# Patient Record
Sex: FEMALE | Race: WHITE | NOT HISPANIC OR LATINO | Employment: OTHER | URBAN - METROPOLITAN AREA
[De-identification: names, ages, dates, MRNs, and addresses within clinical notes are randomized per-mention and may not be internally consistent; named-entity substitution may affect disease eponyms.]

---

## 2019-12-18 PROCEDURE — 87086 URINE CULTURE/COLONY COUNT: CPT | Performed by: FAMILY MEDICINE

## 2021-03-01 PROBLEM — D05.10 DCIS (DUCTAL CARCINOMA IN SITU): Status: ACTIVE | Noted: 2019-12-27

## 2021-03-01 PROBLEM — E03.9 HYPOTHYROIDISM: Status: ACTIVE | Noted: 2021-03-01

## 2021-03-01 PROBLEM — R87.89 OTHER ABNORMAL FINDINGS IN SPECIMENS FROM FEMALE GENITAL ORGANS: Status: ACTIVE | Noted: 2017-09-26

## 2021-03-01 PROBLEM — E78.5 HYPERLIPIDEMIA: Status: ACTIVE | Noted: 2021-03-01

## 2021-03-01 PROBLEM — E78.00 HYPERCHOLESTEREMIA: Status: ACTIVE | Noted: 2021-03-01

## 2021-03-01 PROBLEM — N89.8 VAGINAL DISCHARGE: Status: ACTIVE | Noted: 2017-09-26

## 2021-03-08 ENCOUNTER — OFFICE VISIT (OUTPATIENT)
Dept: PODIATRY | Facility: CLINIC | Age: 81
End: 2021-03-08

## 2021-03-08 VITALS
HEIGHT: 64 IN | WEIGHT: 126 LBS | DIASTOLIC BLOOD PRESSURE: 79 MMHG | HEART RATE: 86 BPM | BODY MASS INDEX: 21.51 KG/M2 | SYSTOLIC BLOOD PRESSURE: 174 MMHG

## 2021-03-08 DIAGNOSIS — M25.372 ANKLE INSTABILITY, LEFT: ICD-10-CM

## 2021-03-08 DIAGNOSIS — M25.572 ACUTE LEFT ANKLE PAIN: Primary | ICD-10-CM

## 2021-03-08 PROCEDURE — 99203 OFFICE O/P NEW LOW 30 MIN: CPT | Performed by: PODIATRIST

## 2021-03-08 PROCEDURE — 97760 ORTHOTIC MGMT&TRAING 1ST ENC: CPT | Performed by: PODIATRIST

## 2021-03-09 NOTE — PATIENT INSTRUCTIONS
Chronic Ankle Instability  Chronic ankle instability is a condition that makes the ankle weak and more likely to give way. The condition is common among athletes, especially those with prior ankle ligament injury. Ligaments are strong tissues that connect bones to each other.  What are the causes?    This condition is caused by multiple ankle sprains that have not healed properly, leaving the ankle ligaments loose or damaged.  What increases the risk?  This condition is more likely to develop in people who participate in sports in which there is a risk of spraining an ankle. These sports include:  · Cross-country trail running.  · Basketball.  · Baseball.  · Tennis.  · Football.  · Soccer.  What are the signs or symptoms?  Symptoms of this condition include:  · Rolling your ankle repeatedly.  · Swelling.  · Pain.  · Bruising.  · Tenderness.  · Feeling wobbly or unsteady on your foot.  · Difficulty walking on uneven surfaces or in the dark.  How is this diagnosed?  This condition may be diagnosed based on:  · Your symptoms.  · Your medical history.  · A physical exam. Your health care provider will check your balance, strength, and range of motion. He or she will also check your injured ankle against your healthy ankle.  · Imaging tests, such as:  ? An X-ray.  ? A CT scan.  ? An MRI.  ? An ultrasound.  How is this treated?  Treatment for this condition may include:  · Wearing a removable boot, brace, or splint.  · Wearing supportive shoes or shoe inserts.  · Applying ice to the ankle to reduce swelling.  · Taking anti-inflammatory pain medicine.  · Doing exercises (physical therapy).  · Not putting any body weight, or putting only limited body weight, on your ankle for several days.  · Gradually returning to full activity.  · Surgery to repair damaged ligaments.  Usually, surgery is needed only if the condition is severe or if other treatments do not work.  Follow these instructions at home:  If you have a boot,  brace, or splint:  · Wear it as told by your health care provider. Remove it only as told by your health care provider.  · Loosen it if your toes tingle, become numb, or turn cold and blue.  · Keep it clean.  · If it is not waterproof:  ? Do not let it get wet.  ? Cover it with a watertight covering when you take a bath or a shower.  · Ask your health care provider when it is safe to drive with a boot, brace, or splint on your foot.  Managing pain, stiffness, and swelling    · If directed, put ice on the injured area.  ? If you have a removable boot, brace, or splint, remove it as told by your health care provider.  ? Put ice in a plastic bag.  ? Place a towel between your skin and the bag.  ? Leave the ice on for 20 minutes, 2-3 times a day.  · Move your toes, foot, and ankle often to reduce stiffness and swelling.  · Raise (elevate) the injured area above the level of your heart while you are sitting or lying down.  Activity  · Return to your normal activities as told by your health care provider. Ask your health care provider what activities are safe for you.  · Do not put your full body weight on your ankle until your health care provider says that you can.  · Do not do any activities that make pain or swelling worse.  · Do exercises as told by your health care provider.  General instructions  · Take over-the-counter and prescription medicines only as told by your health care provider.  · Wear supportive shoes or inserts as told by your health care provider.  · Keep all follow-up visits as told by your health care provider. This is important.  How is this prevented?  · Wear supportive footwear that is appropriate for your athletic activity.  · Avoid athletic activities that cause pain or swelling in your ankle.  · See your health care provider if you have an ankle sprain that causes pain and swelling for more than 2-4 weeks.  · Do ankle range-of-motion and strengthening exercises as told by your health care  provider before beginning any athletic activity.  · If you start a new athletic activity, start gradually to build up your strength and flexibility.  Contact a health care provider if:  · Your condition is not getting better after 2-4 weeks of treatment.  · You cannot put body weight on your ankle without feeling more pain.  Summary  · Chronic ankle instability is a condition that makes the ankle weak and more likely to give way.  · This condition is caused by multiple ankle sprains that have not healed properly, leaving the ankle ligaments loose or damaged.  · Treatment includes wearing a boot, brace, or splint, taking medicines for pain and inflammation, and using ice on the affected area.  · Follow your health care provider's instructions for caring for your ankle during recovery.  · Contact your health care provider if your ankle does not get better in 2-4 weeks, or if you cannot put weight on your ankle without feeling more pain.  This information is not intended to replace advice given to you by your health care provider. Make sure you discuss any questions you have with your health care provider.  Document Revised: 10/01/2019 Document Reviewed: 10/01/2019  Elsevier Patient Education © 2020 [employer]

## 2021-03-09 NOTE — PROGRESS NOTES
03/08/2021  Foot and Ankle Surgery - New Patient   Provider: Dr. Tamir Melton DPM  Location: [facility]    Subjective:  Karla Pollard is a 81 y.o. female.     Chief Complaint   Patient presents with   • Left Ankle - Pain       HPI: Patient is an 81-year-old female that presents with intermittent discomfort and feelings that the left ankle will give out on her.  She states that she had an inversion injury approximately 4 months ago.  She did notice some significant swelling, bruising, and discomfort after the injury.  She did notice gradual improvement.  She recently went to the urgent care center because of continued discomfort and limitation.  She rates the pain a 6 out of 10 at times.  She is concerned that she is unable to do her normal activities because of the limitation.  She states that she is very active and likes to walk for exercise.  She also notices symptoms with uneven terrain.  She has not had previous issues with the left ankle.  She denies any other complaints today    Allergies   Allergen Reactions   • Bisacodyl Rash   • Sulfa Antibiotics Hives   • Docusate Calcium Rash   • Sulfamethoxazole-Trimethoprim Rash       Past Medical History:   Diagnosis Date   • Disease of thyroid gland    • Hyperlipidemia        Past Surgical History:   Procedure Laterality Date   • BREAST SURGERY     • HYSTERECTOMY     • TONSILLECTOMY     • VASCULAR SURGERY         Family History   Family history unknown: Yes       Social History     Socioeconomic History   • Marital status:      Spouse name: Not on file   • Number of children: Not on file   • Years of education: Not on file   • Highest education level: Not on file   Tobacco Use   • Smoking status: Never Smoker   • Smokeless tobacco: Never Used   Vaping Use   • Vaping Use: Never used   Substance and Sexual Activity   • Alcohol use: Never   • Drug use: Never   • Sexual activity: Defer        Current Outpatient Medications on File Prior to Visit  "  Medication Sig Dispense Refill   • Cholecalciferol (VITAMIN D3) 50 MCG (2000 UT) capsule TAKE ONE CAPSULE BY MOUTH DAILY     • levothyroxine (SYNTHROID, LEVOTHROID) 75 MCG tablet      • simvastatin (ZOCOR) 40 MG tablet SIMVASTATIN 40 MG TABS       No current facility-administered medications on file prior to visit.       Review of Systems:  General: Denies fever, chills, fatigue, and weakness.  Eyes: Denies vision loss, blurry vision, and excessive redness.  ENT: Denies hearing issues and difficulty swallowing.  Cardiovascular: Denies palpitations, chest pain, or syncopal episodes.  Respiratory: Denies shortness of breath, wheezing, and coughing.  GI: Denies abdominal pain, nausea, and vomiting.   : Denies frequency, hematuria, and urgency.  Musculoskeletal: + Left ankle pain  Derm: Denies rash, open wounds, or suspicious lesions.  Neuro: Denies headaches, numbness, loss of coordination, and tremors.  Psych: Denies anxiety and depression.  Endocrine: Denies temperature intolerance and changes in appetite.  Heme: Denies bleeding disorders or abnormal bruising.     Objective   /79   Pulse 86   Ht 161.3 cm (63.5\")   Wt 57.2 kg (126 lb)   BMI 21.97 kg/m²     Foot/Ankle Exam:       General:   Appearance: appears stated age and healthy    Orientation: AAOx3    Affect: appropriate    Gait: antalgic      VASCULAR      Left Foot Vascularity   Normal vascular exam    Dorsalis pedis:  2+  Posterior tibial:  2+  Skin Temperature: warm    Edema Grading:  None  CFT:  < 3 seconds  Pedal Hair Growth:  Present  Varicosities: none        NEUROLOGIC     Left Foot Neurologic   Light touch sensation:  Normal  Hot/cold sensation: normal    Achilles reflex:  2+     MUSCULOSKELETAL      Left Foot Musculoskeletal   Ecchymosis:  None  Tenderness: lateral malleolus and anterior tibiotalar joint line    Arch:  Normal     MUSCLE STRENGTH     Left Foot Muscle Strength   Normal strength    Foot dorsiflexion:  5  Foot plantar " flexion:  5  Foot inversion:  5  Foot eversion:  5     RANGE OF MOTION      Left Foot Range of Motion   Foot and ankle ROM within normal limits       DERMATOLOGIC     Left Foot Dermatologic   Skin: skin intact       TESTS     Left Foot Tests   Anterior drawer: positive    Varus tilt: negative        Left Foot Additional Comments: Mild instability with anterior drawer testing but no significant laxity as compared to the contralateral extremity.  Mild discomfort with palpation to the anterior lateral aspect of the ankle.  No gross deformity.  No proximal fibular pain.      Assessment/Plan   Diagnoses and all orders for this visit:    1. Acute left ankle pain (Primary)  -     XR Ankle 3+ View Left    2. Ankle instability, left      Patient presents with issues involving the left ankle.  Imaging was reviewed showing no obvious fracture dislocation or dipika degenerative changes.  On exam, she does have discomfort involving the anterior lateral aspect of the ankle and complains of weakness.  She does have mild instability as compared to the right lower extremity.  I explained that symptoms are consistent with mild instability.  I have recommended that we proceed with a lace up ankle brace.  We did dispense the brace and greater than 15 minutes was spent reviewing the proper use and effects.  I have also asked that she start stretching, manual therapy, and strengthening exercises which were reviewed in office.  We did review appropriate shoes and to avoid uneven terrain and high impact activities.  I do feel that symptoms will gradually improve.  She is to call with any additional issues or concerns.  I would like to see her in 4 weeks for reevaluation    Orders Placed This Encounter   Procedures   • XR Ankle 3+ View Left     Order Specific Question:   Reason for Exam:     Answer:   Left ankle pain for about 4 months just has got worse RM 9 WB     Order Specific Question:   Does this patient have a diabetic  monitoring/medication delivering device on?     Answer:   No        Note is dictated utilizing voice recognition software. Unfortunately this leads to occasional typographical errors. I apologize in advance if the situation occurs. If questions occur please do not hesitate to call our office.

## 2021-04-05 ENCOUNTER — OFFICE VISIT (OUTPATIENT)
Dept: PODIATRY | Facility: CLINIC | Age: 81
End: 2021-04-05

## 2021-04-05 VITALS
DIASTOLIC BLOOD PRESSURE: 77 MMHG | HEART RATE: 96 BPM | HEIGHT: 64 IN | WEIGHT: 126 LBS | BODY MASS INDEX: 21.51 KG/M2 | SYSTOLIC BLOOD PRESSURE: 198 MMHG

## 2021-04-05 DIAGNOSIS — M72.2 PLANTAR FASCIITIS OF LEFT FOOT: ICD-10-CM

## 2021-04-05 DIAGNOSIS — M25.372 ANKLE INSTABILITY, LEFT: Primary | ICD-10-CM

## 2021-04-05 PROCEDURE — 99213 OFFICE O/P EST LOW 20 MIN: CPT | Performed by: PODIATRIST

## 2021-04-05 NOTE — PROGRESS NOTES
"04/05/2021  Foot and Ankle Surgery - Established Patient/Follow-up  Provider: Dr. Tamir Melton DPM  Location: [facility]    Subjective:  Karla Pollard is a 81 y.o. female.     Chief Complaint   Patient presents with   • Left Ankle - Follow-up       HPI: Patient returns for follow-up on left foot and ankle pain.  She states that the ankle does feel better with the brace.  She continues to have discomfort involving the lateral column of the foot.  Symptoms are worse with increased activity improved with rest.  She continues to walk approximately 1 mile with friends 3 days a week.  She has been wearing the brace on a daily basis.    Allergies   Allergen Reactions   • Bisacodyl Rash   • Sulfa Antibiotics Hives   • Docusate Calcium Rash   • Sulfamethoxazole-Trimethoprim Rash       Current Outpatient Medications on File Prior to Visit   Medication Sig Dispense Refill   • Cholecalciferol (VITAMIN D3) 50 MCG (2000 UT) capsule TAKE ONE CAPSULE BY MOUTH DAILY     • levothyroxine (SYNTHROID, LEVOTHROID) 75 MCG tablet      • simvastatin (ZOCOR) 40 MG tablet SIMVASTATIN 40 MG TABS       No current facility-administered medications on file prior to visit.       Objective   BP (!) 198/77   Pulse 96   Ht 161.3 cm (63.5\")   Wt 57.2 kg (126 lb)   BMI 21.97 kg/m²      General:   Appearance: appears stated age and healthy    Orientation: AAOx3    Affect: appropriate    Gait: antalgic       VASCULAR       Left Foot Vascularity   Normal vascular exam    Dorsalis pedis:  2+  Posterior tibial:  2+  Skin Temperature: warm    Edema Grading:  None  CFT:  < 3 seconds  Pedal Hair Growth:  Present  Varicosities: none        NEUROLOGIC      Left Foot Neurologic   Light touch sensation:  Normal  Hot/cold sensation: normal    Achilles reflex:  2+      MUSCULOSKELETAL       Left Foot Musculoskeletal   Ecchymosis:  None  Tenderness: lateral malleolus and anterior tibiotalar joint line    Arch:  Normal      MUSCLE STRENGTH      Left " Foot Muscle Strength   Normal strength    Foot dorsiflexion:  5  Foot plantar flexion:  5  Foot inversion:  5  Foot eversion:  5      RANGE OF MOTION       Left Foot Range of Motion   Foot and ankle ROM within normal limits        DERMATOLOGIC      Left Foot Dermatologic   Skin: skin intact        TESTS      Left Foot Tests   Anterior drawer: positive    Varus tilt: negative        Left Foot Additional Comments: Instability is relatively unchanged.  She does have mild discomfort involving the lateral column of the foot    Assessment/Plan   Diagnoses and all orders for this visit:    1. Ankle instability, left (Primary)    2. Plantar fasciitis of left foot      Patient returns with some improvement as compared to last exam.  She feels approximately 20% better with the brace.  At this time, she does complain of pain involving the lateral column of the foot.  She states that symptoms are better with activity and typically noticed after periods of rest.  I do feel that she is having issues consistent with plantar fasciitis.  I have recommended that she acquire a pair of over-the-counter arch supports.  We did discuss proper use and effects.  I would like her to wean herself away from the lace up brace.  I have suggested that she perform low impact exercises instead of walking.  We also discussed the possibility of formal physical therapy in which she would like to hold off on.  I will see her in 4 weeks for reevaluation.    No orders of the defined types were placed in this encounter.         Note is dictated utilizing voice recognition software. Unfortunately this leads to occasional typographical errors. I apologize in advance if the situation occurs. If questions occur please do not hesitate to call our office.

## 2021-05-01 PROCEDURE — 87086 URINE CULTURE/COLONY COUNT: CPT | Performed by: NURSE PRACTITIONER

## 2021-05-01 PROCEDURE — 87077 CULTURE AEROBIC IDENTIFY: CPT | Performed by: NURSE PRACTITIONER

## 2021-05-01 PROCEDURE — 87186 SC STD MICRODIL/AGAR DIL: CPT | Performed by: NURSE PRACTITIONER

## 2021-05-03 ENCOUNTER — OFFICE VISIT (OUTPATIENT)
Dept: PODIATRY | Facility: CLINIC | Age: 81
End: 2021-05-03

## 2021-05-03 VITALS
DIASTOLIC BLOOD PRESSURE: 73 MMHG | SYSTOLIC BLOOD PRESSURE: 168 MMHG | HEIGHT: 63 IN | HEART RATE: 88 BPM | WEIGHT: 125 LBS | BODY MASS INDEX: 22.15 KG/M2

## 2021-05-03 DIAGNOSIS — M25.372 ANKLE INSTABILITY, LEFT: ICD-10-CM

## 2021-05-03 DIAGNOSIS — M76.72 PERONEAL TENDINITIS OF LEFT LOWER EXTREMITY: Primary | ICD-10-CM

## 2021-05-03 PROCEDURE — 99213 OFFICE O/P EST LOW 20 MIN: CPT | Performed by: PODIATRIST

## 2021-05-04 NOTE — PROGRESS NOTES
"05/03/2021  Foot and Ankle Surgery - Established Patient/Follow-up  Provider: Dr. Tamir Melton DPM  Location: [facility]    Subjective:  Karla Pollard is a 81 y.o. female.     Chief Complaint   Patient presents with   • Left Foot - Follow-up   • Left Ankle - Follow-up       HPI: Patient returns for follow-up regarding her left lower extremity.  She states that she has noticed continued improvement with the over-the-counter arch supports.  She has remained quite active but continues to deal with discomfort after periods of inactivity.  She complains of most of the pain involving the lateral aspect of the foot and ankle.  She has not had any injury or other complaints.    Allergies   Allergen Reactions   • Bisacodyl Rash   • Sulfa Antibiotics Hives   • Docusate Calcium Rash   • Sulfamethoxazole-Trimethoprim Rash       Current Outpatient Medications on File Prior to Visit   Medication Sig Dispense Refill   • cefdinir (OMNICEF) 300 MG capsule Take 1 capsule by mouth 2 (Two) Times a Day for 10 days. 20 capsule 0   • Cholecalciferol (VITAMIN D3) 50 MCG (2000 UT) capsule TAKE ONE CAPSULE BY MOUTH DAILY     • levothyroxine (SYNTHROID, LEVOTHROID) 75 MCG tablet      • simvastatin (ZOCOR) 40 MG tablet SIMVASTATIN 40 MG TABS       No current facility-administered medications on file prior to visit.       Objective   /73   Pulse 88   Ht 160 cm (63\")   Wt 56.7 kg (125 lb)   BMI 22.14 kg/m²     General:   Appearance: appears stated age and healthy    Orientation: AAOx3    Affect: appropriate    Gait: antalgic       VASCULAR       Left Foot Vascularity   Normal vascular exam    Dorsalis pedis:  2+  Posterior tibial:  2+  Skin Temperature: warm    Edema Grading:  None  CFT:  < 3 seconds  Pedal Hair Growth:  Present  Varicosities: none        NEUROLOGIC      Left Foot Neurologic   Light touch sensation:  Normal  Hot/cold sensation: normal    Achilles reflex:  2+      MUSCULOSKELETAL       Left Foot " Musculoskeletal   Ecchymosis:  None  Tenderness: lateral malleolus and anterior tibiotalar joint line    Arch:  Normal      MUSCLE STRENGTH      Left Foot Muscle Strength   Normal strength    Foot dorsiflexion:  5  Foot plantar flexion:  5  Foot inversion:  5  Foot eversion:  5      RANGE OF MOTION       Left Foot Range of Motion   Foot and ankle ROM within normal limits        DERMATOLOGIC      Left Foot Dermatologic   Skin: skin intact        TESTS      Left Foot Tests   Anterior drawer: positive    Varus tilt: negative        Left Foot Additional Comments: Continued discomfort involving the lateral aspect of the ankle and along the peroneal tendon course.  Discomfort is mild.  No swelling or signs of inflammation.  Range of motion is appropriate.  Mild instability as compared to the contralateral extremity    Assessment/Plan   Diagnoses and all orders for this visit:    1. Peroneal tendinitis of left lower extremity (Primary)  -     Ambulatory Referral to Physical Therapy Evaluate and treat    2. Ankle instability, left      Patient is doing relatively well and has noticed improvement with the inserts but continues to remain symptomatic at the level of the peroneal tendons.  Given that she continues to have issues, I have recommended that we proceed with formal physical therapy.  Referral has been placed for treatment.  I have asked that she continue at home exercises and wearing the over-the-counter inserts.  We did discuss proper activity level and use of OTC anti-inflammatories as needed.  I would like her to monitor and return in 6 to 8 weeks for reevaluation.  If she continues to have limitation at that time, may need to consider further evaluation with an MRI.    Orders Placed This Encounter   Procedures   • Ambulatory Referral to Physical Therapy Evaluate and treat     Referral Priority:   Routine     Referral Type:   Physical Therapy     Referral Reason:   Specialty Services Required     Requested  Specialty:   Physical Therapy     Number of Visits Requested:   1          Note is dictated utilizing voice recognition software. Unfortunately this leads to occasional typographical errors. I apologize in advance if the situation occurs. If questions occur please do not hesitate to call our office.

## 2021-05-12 ENCOUNTER — TREATMENT (OUTPATIENT)
Dept: PHYSICAL THERAPY | Facility: CLINIC | Age: 81
End: 2021-05-12

## 2021-05-12 DIAGNOSIS — M76.72 PERONEAL TENDONITIS OF LEFT LOWER LEG: Primary | ICD-10-CM

## 2021-05-12 DIAGNOSIS — M79.672 LEFT FOOT PAIN: ICD-10-CM

## 2021-05-12 PROCEDURE — 97162 PT EVAL MOD COMPLEX 30 MIN: CPT | Performed by: PHYSICAL THERAPIST

## 2021-05-12 PROCEDURE — 97110 THERAPEUTIC EXERCISES: CPT | Performed by: PHYSICAL THERAPIST

## 2021-05-12 NOTE — PROGRESS NOTES
Physical Therapy Initial Evaluation and Plan of Care    Patient: Karla Pollard   : 1940  Diagnosis/ICD-10 Code:  Peroneal tendonitis of left lower leg [M76.72]  Referring practitioner: LORRI Melton DPM    Subjective Evaluation    History of Present Illness  Mechanism of injury: No specific mechanism - does not remember any incident but reports could have stepped on acorn in working in her yard     Subjective comment: Patient is 81 year old female who presetns with a diagnosis of left peroneal tendonitis.  She reports 4-6 month history of pain and went to MD after symptoms did not improved over several months.  She reports has gotten new shoes per DPM instructions and noticed some improvement with this.  No prior injury or pain in her foot.   Patient Occupation: Retired.  Quality of life: good    Pain  Current pain ratin  At best pain ratin  At worst pain ratin  Pain location: Left ankle   Quality: dull ache  Relieving factors: rest (New shoes )  Aggravating factors: ambulation and prolonged positioning  Progression: improved    Social Support  Lives in: multiple-level home  Lives with: spouse             Objective          Static Posture     Comments  Flexible pes planus, R hip ER at rest     Active Range of Motion   Left Ankle/Foot   Dorsiflexion (kf): 2 degrees   Plantar flexion: 48 degrees   Inversion: 38 degrees   Eversion: 15 degrees     Right Ankle/Foot   Dorsiflexion (kf): 5 degrees   Plantar flexion: 41 degrees   Inversion: 40 degrees   Eversion: 18 degrees     Joint Play   Left Ankle/Foot  Joints within functional limits are the midfoot. Hypomobile in the talocrural joint and forefoot.     Right Ankle/Foot  Joints within functional limits are the forefoot. Hypomobile in the talocrural joint and midfoot.     Strength/Myotome Testing     Left Ankle/Foot   Dorsiflexion: 5  Inversion: 5  Eversion: 4+ (Pain )    Right Ankle/Foot   Dorsiflexion: 5  Inversion: 5  Eversion: 5    Functional  Assessment     Comments  Single leg balance - R 4 seconds  L 2 seconds    Five Time Sit to Stand 16.56 second          Assessment & Plan     Assessment  Impairments: abnormal gait, abnormal or restricted ROM, activity intolerance, impaired balance, impaired physical strength, lacks appropriate home exercise program, pain with function and weight-bearing intolerance  Assessment details: Presents with limit in L ankle/foot ROM, strength, pain to palpation at insertion left peroneals, altered gait, slight decrease in functional LE strength per FTSST.  She would benefit from skilled therapy to address the above and restore to highest level of prior function.   Prognosis: good  Functional Limitations: lifting  Goals  Plan Goals: ST. Independent and compliant with HEP over 2 weeks.   2. To tolerate progression of balance and foot/ankle strength progression w/o increased pain over 2 weeks.   3. B DF AROM improved 3-5 degrees or greater over 2 weeks.     LT. LEFS score = 90% or greater within 6 weeks.   2. Left ankle/foot strength = R within 6 weeks.   3. Gait mechanics normal over 6 weeks with ability to resume prior walking program w/o pain.   4. FTSST time decreased to 13 seconds or less by discharge.       Plan  Therapy options: will be seen for skilled physical therapy services  Planned modality interventions: cryotherapy, electrical stimulation/Russian stimulation, TENS and thermotherapy (hydrocollator packs)  Planned therapy interventions: manual therapy, neuromuscular re-education, balance/weight-bearing training, soft tissue mobilization, flexibility, functional ROM exercises, strengthening, stretching, gait training, home exercise program, therapeutic activities and joint mobilization  Frequency: 1x week  Duration in visits: 6  Treatment plan discussed with: patient          Timed:           Therapeutic Exercise:    12     mins  [id number];       Un-Timed:  Mod Eval     30     Mins  [id number]      Timed  Treatment:   42   mins   Total Treatment:     42   mins    PT SIGNATURE: Nando Loredo PT, DPT       DATE TREATMENT INITIATED: 5/12/2021    Medicare Initial Certification  Certification Period: 8/10/2021  I certify that the therapy services are furnished while this patient is under my care.  The services outlined above are required by this patient, and will be reviewed every 90 days.     PHYSICIAN: LORRI Melton, MUSHTAQ      DATE:     Please sign and return via fax to [phone].. Thank you, [facility].

## 2021-05-19 ENCOUNTER — TREATMENT (OUTPATIENT)
Dept: PHYSICAL THERAPY | Facility: CLINIC | Age: 81
End: 2021-05-19

## 2021-05-19 DIAGNOSIS — M79.672 LEFT FOOT PAIN: ICD-10-CM

## 2021-05-19 DIAGNOSIS — M76.72 PERONEAL TENDONITIS OF LEFT LOWER LEG: Primary | ICD-10-CM

## 2021-05-19 PROCEDURE — 97140 MANUAL THERAPY 1/> REGIONS: CPT | Performed by: PHYSICAL THERAPIST

## 2021-05-19 PROCEDURE — 97110 THERAPEUTIC EXERCISES: CPT | Performed by: PHYSICAL THERAPIST

## 2021-05-19 NOTE — PROGRESS NOTES
Physical Therapy Daily Progress Note  Visit: 2    Karla Pollard reports: no new issues - has some questions/needs review of HEP.     Subjective       Objective   See Exercise, Manual, and Modality Logs for complete treatment.       Assessment/Plan     Mild R knee and L ankle pain with activity.  Improved with cueing and decreased loading.     Plan:    Continue current         Timed:         Manual Therapy:    9     mins  87237;     Therapeutic Exercise:    33     mins  93647;         Timed Treatment:   42   mins   Total Treatment:     42   mins  Nando Loredo, PT, DPT  Physical Therapist

## 2021-05-24 ENCOUNTER — TREATMENT (OUTPATIENT)
Dept: PHYSICAL THERAPY | Facility: CLINIC | Age: 81
End: 2021-05-24

## 2021-05-24 DIAGNOSIS — M79.672 LEFT FOOT PAIN: ICD-10-CM

## 2021-05-24 DIAGNOSIS — M76.72 PERONEAL TENDONITIS OF LEFT LOWER LEG: Primary | ICD-10-CM

## 2021-05-24 PROCEDURE — 97140 MANUAL THERAPY 1/> REGIONS: CPT | Performed by: PHYSICAL THERAPIST

## 2021-05-24 PROCEDURE — 97110 THERAPEUTIC EXERCISES: CPT | Performed by: PHYSICAL THERAPIST

## 2021-05-24 PROCEDURE — 97530 THERAPEUTIC ACTIVITIES: CPT | Performed by: PHYSICAL THERAPIST

## 2021-05-26 NOTE — PROGRESS NOTES
Physical Therapy Daily Progress Note  Visit: 3    Karla Pollard reports: still having some pain on the outside of her foot.  Reports she was hoping her pain would improve more quickly than it has so far.     Subjective       Objective   See Exercise, Manual, and Modality Logs for complete treatment.       Assessment/Plan     Reviewed HEP as she was performing isometric IR instead of ER with HEP. Also has some knee pain with LE strength activity but decreased with less resistance and ROM.     Plan:    Continue current         Timed:         Manual Therapy:    12     mins  07324;     Therapeutic Exercise:    21     mins  84671;     Therapeutic Activity:     10     mins  30977;         Timed Treatment:   43   mins   Total Treatment:     43   mins  Nando Loredo, PT, DPT  Physical Therapist

## 2021-06-02 ENCOUNTER — TREATMENT (OUTPATIENT)
Dept: PHYSICAL THERAPY | Facility: CLINIC | Age: 81
End: 2021-06-02

## 2021-06-02 DIAGNOSIS — M76.72 PERONEAL TENDONITIS OF LEFT LOWER LEG: Primary | ICD-10-CM

## 2021-06-02 DIAGNOSIS — M79.672 LEFT FOOT PAIN: ICD-10-CM

## 2021-06-02 PROCEDURE — 97110 THERAPEUTIC EXERCISES: CPT | Performed by: PHYSICAL THERAPIST

## 2021-06-02 PROCEDURE — 97140 MANUAL THERAPY 1/> REGIONS: CPT | Performed by: PHYSICAL THERAPIST

## 2021-06-02 NOTE — PROGRESS NOTES
Physical Therapy Daily Progress Note  Visit: 4    Karla Pollard reports:  Can tell her LE strength is improving - little change to foot pain yet.     Subjective       Objective   See Exercise, Manual, and Modality Logs for complete treatment.       Assessment/Plan     Demonstrating improving foot/ankle ROM/strength on LE and increased LE strength generally.  Reviewed exercise technique for HEP with instruction for pain free ROM/loading only.    Plan:    Continue current           Timed:         Manual Therapy:    9     mins  67134;     Therapeutic Exercise:    34     mins  88904;         Timed Treatment:   43   mins   Total Treatment:     43   mins  Nando Loredo PT, DPT  Physical Therapist

## 2021-06-09 ENCOUNTER — TREATMENT (OUTPATIENT)
Dept: PHYSICAL THERAPY | Facility: CLINIC | Age: 81
End: 2021-06-09

## 2021-06-09 DIAGNOSIS — M79.672 LEFT FOOT PAIN: ICD-10-CM

## 2021-06-09 DIAGNOSIS — M76.72 PERONEAL TENDONITIS OF LEFT LOWER LEG: Primary | ICD-10-CM

## 2021-06-09 PROCEDURE — 97112 NEUROMUSCULAR REEDUCATION: CPT | Performed by: PHYSICAL THERAPIST

## 2021-06-09 PROCEDURE — 97110 THERAPEUTIC EXERCISES: CPT | Performed by: PHYSICAL THERAPIST

## 2021-06-09 PROCEDURE — 97140 MANUAL THERAPY 1/> REGIONS: CPT | Performed by: PHYSICAL THERAPIST

## 2021-06-10 NOTE — PROGRESS NOTES
Physical Therapy Daily Progress Note  Visit: 5    Karla Pollard reports: she can tell improvement in LE strength but little change to her pain levels yet.     Subjective       Objective   See Exercise, Manual, and Modality Logs for complete treatment.       Assessment/Plan     Gait mechanics and LE strength improving as is activity tolerance w/o increased pain.    Plan:    Continue current           Timed:         Manual Therapy:    13     mins  73735;     Therapeutic Exercise:    21     mins  00036;     Neuromuscular Jojo:    10    mins  86696;      Timed Treatment:   44   mins   Total Treatment:     44   mins  Nando Loredo PT, DPT  Physical Therapist

## 2021-06-18 ENCOUNTER — TREATMENT (OUTPATIENT)
Dept: PHYSICAL THERAPY | Facility: CLINIC | Age: 81
End: 2021-06-18

## 2021-06-18 DIAGNOSIS — M76.72 PERONEAL TENDONITIS OF LEFT LOWER LEG: Primary | ICD-10-CM

## 2021-06-18 DIAGNOSIS — M79.672 LEFT FOOT PAIN: ICD-10-CM

## 2021-06-18 PROCEDURE — 97110 THERAPEUTIC EXERCISES: CPT | Performed by: PHYSICAL THERAPIST

## 2021-06-18 PROCEDURE — 97140 MANUAL THERAPY 1/> REGIONS: CPT | Performed by: PHYSICAL THERAPIST

## 2021-06-18 NOTE — PROGRESS NOTES
Physical Therapy Daily Progress Note  Visit: 6    Karla Pollard reports: she feels pain levels are improving some in her left foot.     Subjective       Objective   See Exercise, Manual, and Modality Logs for complete treatment.       Assessment/Plan     Left lateral foot pain improving and LE strength and activity tolerance increasing.    Plan:    Continue current           Timed:         Manual Therapy:    10     mins  65880;     Therapeutic Exercise:    35     mins  37361;         Timed Treatment:   45   mins   Total Treatment:     45   mins  Nando Loredo PT, DPT  Physical Therapist  
No

## 2021-06-29 ENCOUNTER — TREATMENT (OUTPATIENT)
Dept: PHYSICAL THERAPY | Facility: CLINIC | Age: 81
End: 2021-06-29

## 2021-06-29 DIAGNOSIS — M76.72 PERONEAL TENDONITIS OF LEFT LOWER LEG: Primary | ICD-10-CM

## 2021-06-29 PROCEDURE — 97140 MANUAL THERAPY 1/> REGIONS: CPT | Performed by: PHYSICAL THERAPIST

## 2021-06-29 PROCEDURE — 97110 THERAPEUTIC EXERCISES: CPT | Performed by: PHYSICAL THERAPIST

## 2021-06-30 NOTE — PROGRESS NOTES
Physical Therapy Daily Progress Note  Visit: 7    Karla Pollard reports: frustrated still having pain in her left foot.  She reports trying walking for shorter duration and was able to do so w/o increased pain.     Subjective       Objective   See Exercise, Manual, and Modality Logs for complete treatment.       Assessment/Plan     Has demonstrated improved walking tolerance and gait mechanics, LE strength and ankle mobility.   to palpation along peroneal insertion L and with resistance above low level.    Plan:    Continue current         Timed:         Manual Therapy:    11     mins  56026;     Therapeutic Exercise:    33     mins  42612;            Timed Treatment:   44   mins   Total Treatment:     44   mins  Nando Loredo, PT, DPT  Physical Therapist

## 2021-07-06 ENCOUNTER — OFFICE VISIT (OUTPATIENT)
Dept: PODIATRY | Facility: CLINIC | Age: 81
End: 2021-07-06

## 2021-07-06 VITALS — WEIGHT: 128 LBS | HEIGHT: 63 IN | BODY MASS INDEX: 22.68 KG/M2

## 2021-07-06 DIAGNOSIS — M76.72 PERONEAL TENDINITIS OF LEFT LOWER EXTREMITY: ICD-10-CM

## 2021-07-06 DIAGNOSIS — M25.372 ANKLE INSTABILITY, LEFT: ICD-10-CM

## 2021-07-06 DIAGNOSIS — M25.572 ACUTE LEFT ANKLE PAIN: Primary | ICD-10-CM

## 2021-07-06 PROCEDURE — 99213 OFFICE O/P EST LOW 20 MIN: CPT | Performed by: PODIATRIST

## 2021-07-06 NOTE — PROGRESS NOTES
"07/06/2021  Foot and Ankle Surgery - Established Patient/Follow-up  Provider: Dr. Tamir Melton DPM  Location: [facility]    Subjective:  Karla Pollard is a 81 y.o. female.     Chief Complaint   Patient presents with   • Left Foot - Follow-up   • Left Ankle - Follow-up       HPI: Patient returns for follow-up regarding left foot and ankle pain.  She states that she has changed shoes and has noticed some improvement but she continues to have isolated discomfort involving the lateral aspect of the ankle.  No other issues.    Allergies   Allergen Reactions   • Bisacodyl Rash   • Sulfa Antibiotics Hives   • Docusate Calcium Rash   • Sulfamethoxazole-Trimethoprim Rash       Current Outpatient Medications on File Prior to Visit   Medication Sig Dispense Refill   • Cholecalciferol (VITAMIN D3) 50 MCG (2000 UT) capsule TAKE ONE CAPSULE BY MOUTH DAILY     • levothyroxine (SYNTHROID, LEVOTHROID) 75 MCG tablet      • simvastatin (ZOCOR) 40 MG tablet SIMVASTATIN 40 MG TABS       No current facility-administered medications on file prior to visit.       Objective   Ht 160 cm (63\")   Wt 58.1 kg (128 lb)   BMI 22.67 kg/m²     General:   Appearance: appears stated age and healthy    Orientation: AAOx3    Affect: appropriate    Gait: antalgic       VASCULAR       Left Foot Vascularity   Normal vascular exam    Dorsalis pedis:  2+  Posterior tibial:  2+  Skin Temperature: warm    Edema Grading:  None  CFT:  < 3 seconds  Pedal Hair Growth:  Present  Varicosities: none        NEUROLOGIC      Left Foot Neurologic   Light touch sensation:  Normal  Hot/cold sensation: normal    Achilles reflex:  2+      MUSCULOSKELETAL       Left Foot Musculoskeletal   Ecchymosis:  None  Tenderness: lateral malleolus and anterior tibiotalar joint line    Arch:  Normal      MUSCLE STRENGTH      Left Foot Muscle Strength   Normal strength    Foot dorsiflexion:  5  Foot plantar flexion:  5  Foot inversion:  5  Foot eversion:  5      RANGE OF " MOTION       Left Foot Range of Motion   Foot and ankle ROM within normal limits        DERMATOLOGIC      Left Foot Dermatologic   Skin: skin intact        TESTS      Left Foot Tests   Anterior drawer: positive    Varus tilt: negative     Continued discomfort with palpation involving the lateral aspect of the ankle at the level of the peroneal tendons    Assessment/Plan   Diagnoses and all orders for this visit:    1. Acute left ankle pain (Primary)  -     MRI Ankle Left Without Contrast; Future    2. Peroneal tendinitis of left lower extremity    3. Ankle instability, left  -     MRI Ankle Left Without Contrast; Future      Patient continues to have discomfort involving the distal course of the peroneal tendons.  Given that she has tried conservative care and continues to have pain and limitation, I do recommend that we proceed with an MRI for further evaluation of the soft tissue structures.  Patient understands and agrees.  I have asked that she decrease her recreational activity.  She may remain in the regular shoe with the use of the lace up brace if beneficial.  I would like to see her in 2 weeks for MRI result discussion and further planning    Orders Placed This Encounter   Procedures   • MRI Ankle Left Without Contrast     Standing Status:   Future     Standing Expiration Date:   7/6/2022     Order Specific Question:   Release to patient     Answer:   Immediate          Note is dictated utilizing voice recognition software. Unfortunately this leads to occasional typographical errors. I apologize in advance if the situation occurs. If questions occur please do not hesitate to call our office.

## 2021-07-08 ENCOUNTER — TREATMENT (OUTPATIENT)
Dept: PHYSICAL THERAPY | Facility: CLINIC | Age: 81
End: 2021-07-08

## 2021-07-08 DIAGNOSIS — M76.72 PERONEAL TENDONITIS OF LEFT LOWER LEG: Primary | ICD-10-CM

## 2021-07-08 DIAGNOSIS — M79.672 LEFT FOOT PAIN: ICD-10-CM

## 2021-07-08 PROCEDURE — 97110 THERAPEUTIC EXERCISES: CPT | Performed by: PHYSICAL THERAPIST

## 2021-07-08 PROCEDURE — 97140 MANUAL THERAPY 1/> REGIONS: CPT | Performed by: PHYSICAL THERAPIST

## 2021-07-09 NOTE — PROGRESS NOTES
Physical Therapy Daily Progress Note  Visit: 8    Karla Pollard reports: returned to MD and order MRI per persistent pain.  She reports does feel better consistently after PT visits.     Subjective       Objective   See Exercise, Manual, and Modality Logs for complete treatment.       Assessment/Plan     LE strength improved - little change to pain with walking standing for prolonged periods.  Plan to continue treatment until post MRI per symptom relief post visit.     Plan:    Continue current         Timed:         Manual Therapy:    11     mins  47208;     Therapeutic Exercise:    31     mins  81167;         Timed Treatment:   42   mins   Total Treatment:     42   mins  Nando Loredo, PT, DPT  Physical Therapist

## 2021-07-15 ENCOUNTER — TREATMENT (OUTPATIENT)
Dept: PHYSICAL THERAPY | Facility: CLINIC | Age: 81
End: 2021-07-15

## 2021-07-15 DIAGNOSIS — M79.672 LEFT FOOT PAIN: ICD-10-CM

## 2021-07-15 DIAGNOSIS — M76.72 PERONEAL TENDONITIS OF LEFT LOWER LEG: Primary | ICD-10-CM

## 2021-07-15 PROCEDURE — 97110 THERAPEUTIC EXERCISES: CPT | Performed by: PHYSICAL THERAPIST

## 2021-07-15 PROCEDURE — 97140 MANUAL THERAPY 1/> REGIONS: CPT | Performed by: PHYSICAL THERAPIST

## 2021-07-15 NOTE — PROGRESS NOTES
Physical Therapy Daily Progress Note  Visit: 9    Karla Pollard reports: scheduled for MRI in 1.5 weeks - feeling better overall.     Subjective       Objective   See Exercise, Manual, and Modality Logs for complete treatment.       Assessment/Plan     Decreased tenderness to palpation and with resisted ankle motions.  Knee pain more limiting than ankle/foot today.    Plan:    Continue current         Timed:         Manual Therapy:    9     mins  27729;     Therapeutic Exercise:    30     mins  21575;         Timed Treatment:   39   mins   Total Treatment:     39   mins  Nando Loredo PT, DPT  Physical Therapist

## 2021-07-20 ENCOUNTER — TREATMENT (OUTPATIENT)
Dept: PHYSICAL THERAPY | Facility: CLINIC | Age: 81
End: 2021-07-20

## 2021-07-20 DIAGNOSIS — M79.672 LEFT FOOT PAIN: ICD-10-CM

## 2021-07-20 DIAGNOSIS — M76.72 PERONEAL TENDONITIS OF LEFT LOWER LEG: Primary | ICD-10-CM

## 2021-07-20 PROCEDURE — 97110 THERAPEUTIC EXERCISES: CPT | Performed by: PHYSICAL THERAPIST

## 2021-07-20 PROCEDURE — 97140 MANUAL THERAPY 1/> REGIONS: CPT | Performed by: PHYSICAL THERAPIST

## 2021-07-27 ENCOUNTER — HOSPITAL ENCOUNTER (OUTPATIENT)
Dept: MRI IMAGING | Facility: HOSPITAL | Age: 81
Discharge: HOME | End: 2021-07-27
Admitting: PODIATRIST

## 2021-07-27 DIAGNOSIS — M25.372 ANKLE INSTABILITY, LEFT: ICD-10-CM

## 2021-07-27 DIAGNOSIS — M25.572 ACUTE LEFT ANKLE PAIN: ICD-10-CM

## 2021-07-27 PROCEDURE — 73721 MRI JNT OF LWR EXTRE W/O DYE: CPT

## 2021-08-02 ENCOUNTER — TREATMENT (OUTPATIENT)
Dept: PHYSICAL THERAPY | Facility: CLINIC | Age: 81
End: 2021-08-02

## 2021-08-02 DIAGNOSIS — M79.672 LEFT FOOT PAIN: ICD-10-CM

## 2021-08-02 DIAGNOSIS — M76.72 PERONEAL TENDONITIS OF LEFT LOWER LEG: Primary | ICD-10-CM

## 2021-08-02 PROCEDURE — 97140 MANUAL THERAPY 1/> REGIONS: CPT | Performed by: PHYSICAL THERAPIST

## 2021-08-02 PROCEDURE — 97110 THERAPEUTIC EXERCISES: CPT | Performed by: PHYSICAL THERAPIST

## 2021-08-05 ENCOUNTER — OFFICE VISIT (OUTPATIENT)
Dept: PODIATRY | Facility: CLINIC | Age: 81
End: 2021-08-05

## 2021-08-05 VITALS
WEIGHT: 128 LBS | HEIGHT: 63 IN | DIASTOLIC BLOOD PRESSURE: 79 MMHG | HEART RATE: 85 BPM | BODY MASS INDEX: 22.68 KG/M2 | SYSTOLIC BLOOD PRESSURE: 146 MMHG

## 2021-08-05 DIAGNOSIS — M76.72 PERONEAL TENDINITIS OF LEFT LOWER EXTREMITY: ICD-10-CM

## 2021-08-05 DIAGNOSIS — M25.572 CHRONIC PAIN OF LEFT ANKLE: Primary | ICD-10-CM

## 2021-08-05 DIAGNOSIS — M25.372 ANKLE INSTABILITY, LEFT: ICD-10-CM

## 2021-08-05 DIAGNOSIS — G89.29 CHRONIC PAIN OF LEFT ANKLE: Primary | ICD-10-CM

## 2021-08-05 PROCEDURE — 99213 OFFICE O/P EST LOW 20 MIN: CPT | Performed by: PODIATRIST

## 2021-08-05 NOTE — PROGRESS NOTES
"08/05/2021  Foot and Ankle Surgery - Established Patient/Follow-up  Provider: Dr. Tamir Melton DPM  Location: [facility]    Subjective:  Karla Pollard is a 81 y.o. female.     Chief Complaint   Patient presents with   • Left Ankle - Follow-up     Last pcp appt 2/11/2021       HPI: Patient returns for follow-up regarding her left foot and ankle pain.  She did obtain the MRI.  She states that she has decreased her activity and has noticed improvement.  No other issues today    Allergies   Allergen Reactions   • Bisacodyl Rash   • Sulfa Antibiotics Hives   • Docusate Calcium Rash   • Sulfamethoxazole-Trimethoprim Rash       Current Outpatient Medications on File Prior to Visit   Medication Sig Dispense Refill   • Cholecalciferol (VITAMIN D3) 50 MCG (2000 UT) capsule TAKE ONE CAPSULE BY MOUTH DAILY     • levothyroxine (SYNTHROID, LEVOTHROID) 75 MCG tablet      • simvastatin (ZOCOR) 40 MG tablet SIMVASTATIN 40 MG TABS       No current facility-administered medications on file prior to visit.       Objective   /79   Pulse 85   Ht 160 cm (63\")   Wt 58.1 kg (128 lb)   BMI 22.67 kg/m²     General:   Appearance: appears stated age and healthy    Orientation: AAOx3    Affect: appropriate    Gait: antalgic       VASCULAR       Left Foot Vascularity   Normal vascular exam    Dorsalis pedis:  2+  Posterior tibial:  2+  Skin Temperature: warm    Edema Grading:  None  CFT:  < 3 seconds  Pedal Hair Growth:  Present  Varicosities: none        NEUROLOGIC      Left Foot Neurologic   Light touch sensation:  Normal  Hot/cold sensation: normal    Achilles reflex:  2+      MUSCULOSKELETAL       Left Foot Musculoskeletal   Ecchymosis:  None  Tenderness: lateral malleolus and anterior tibiotalar joint line    Arch:  Normal      MUSCLE STRENGTH      Left Foot Muscle Strength   Normal strength    Foot dorsiflexion:  5  Foot plantar flexion:  5  Foot inversion:  5  Foot eversion:  5      RANGE OF MOTION       Left Foot " Range of Motion   Foot and ankle ROM within normal limits        DERMATOLOGIC      Left Foot Dermatologic   Skin: skin intact        TESTS      Left Foot Tests   Anterior drawer: positive    Varus tilt: negative     Assessment/Plan   Diagnoses and all orders for this visit:    1. Chronic pain of left ankle (Primary)    2. Peroneal tendinitis of left lower extremity    3. Ankle instability, left      Physical exam is relatively unchanged.  She continues to have discomfort with palpation involving the lateral aspect of the foot but states that she has noticed significant improvement since decreasing her activity.  The MRI was independently reviewed and discussed with patient in office.  Findings are consistent with peroneal tendinitis and marrow edema involving the peroneal tubercle of the calcaneus.  I do not feel that she has any profound pathology.  I did review the diagnoses and further treatment options at length with patient.  We did discuss option 1 of continued observation with decreased activity to see if symptoms improve versus consideration for operative intervention.  Patient does not want to consider surgery at this time.  She feels 50% better overall.  I have asked that she consider low impact exercises and continue stretching, range of motion, and manual therapy.  She is to call with any progressive issues or concerns.  I would like to see her in 6 weeks for reevaluation.  Greater than 20 minutes was spent before, during, and after evaluation for patient care    No orders of the defined types were placed in this encounter.         Note is dictated utilizing voice recognition software. Unfortunately this leads to occasional typographical errors. I apologize in advance if the situation occurs. If questions occur please do not hesitate to call our office.

## 2021-08-11 NOTE — PROGRESS NOTES
Physical Therapy Daily Progress Note  Visit: 11    Karla Atul reports: no new issues.  Slightly less sore but still limited walking tolerance.     Subjective       Objective   See Exercise, Manual, and Modality Logs for complete treatment.       Assessment/Plan     Limited tolerance to resisted EV today.  Ankle ROM WFL all planes pain free.  Returns to Dr. Melton neck week to review MRI results and discuss further care.     Plan:    Continue current         Timed:         Manual Therapy:    9     mins  13215;     Therapeutic Exercise:    32     mins  95246;         Timed Treatment:   41   mins   Total Treatment:     41   mins  Nando Loredo, PT, DPT  Physical Therapist

## 2021-08-12 ENCOUNTER — TREATMENT (OUTPATIENT)
Dept: PHYSICAL THERAPY | Facility: CLINIC | Age: 81
End: 2021-08-12

## 2021-08-12 DIAGNOSIS — M76.72 PERONEAL TENDONITIS OF LEFT LOWER LEG: Primary | ICD-10-CM

## 2021-08-12 DIAGNOSIS — M79.672 LEFT FOOT PAIN: ICD-10-CM

## 2021-08-12 PROCEDURE — 97140 MANUAL THERAPY 1/> REGIONS: CPT | Performed by: PHYSICAL THERAPIST

## 2021-08-12 PROCEDURE — 97112 NEUROMUSCULAR REEDUCATION: CPT | Performed by: PHYSICAL THERAPIST

## 2021-08-12 PROCEDURE — 97110 THERAPEUTIC EXERCISES: CPT | Performed by: PHYSICAL THERAPIST

## 2021-08-17 ENCOUNTER — TREATMENT (OUTPATIENT)
Dept: PHYSICAL THERAPY | Facility: CLINIC | Age: 81
End: 2021-08-17

## 2021-08-17 DIAGNOSIS — M79.672 LEFT FOOT PAIN: ICD-10-CM

## 2021-08-17 DIAGNOSIS — M76.72 PERONEAL TENDONITIS OF LEFT LOWER LEG: Primary | ICD-10-CM

## 2021-08-17 PROCEDURE — 97110 THERAPEUTIC EXERCISES: CPT | Performed by: PHYSICAL THERAPIST

## 2021-08-17 PROCEDURE — 97140 MANUAL THERAPY 1/> REGIONS: CPT | Performed by: PHYSICAL THERAPIST

## 2021-08-17 NOTE — PROGRESS NOTES
Physical Therapy Daily Progress Note  Visit: 12    Karla Nett reports: returned to Dr. Melton and wants to continue managing conservatively after review MRI results.     Subjective       Objective   See Exercise, Manual, and Modality Logs for complete treatment.       Assessment/Plan     Better tolerance to resisted EV L ankle - still limited with walking tolerance.     Plan:    Continue current         Timed:         Manual Therapy:    12     mins  05747;     Therapeutic Exercise:    21     mins  14785;     Neuromuscular Jojo:    11    mins  33586;         Timed Treatment:   44   mins   Total Treatment:     44   mins  Nando Loredo PT, DPT  Physical Therapist

## 2021-08-17 NOTE — PROGRESS NOTES
Physical Therapy Daily Progress Note  Visit: 13    Karla Pollard reports: she is pleased as she is starting to tell more improvement in her pain levels.  Reports less AM pain when first standing as well as improvement during the day.     Subjective       Objective   See Exercise, Manual, and Modality Logs for complete treatment.       Assessment/Plan     Pain levels and activity tolerance improving to ADL's and therapeutic exercise.  Still some limits with resisted EV and standing balance and strength progressions per left knee pain but improving.     Plan:    Continue current         Timed:         Manual Therapy:    9     mins  53428;     Therapeutic Exercise:    36     mins  97298;         Timed Treatment:   45   mins   Total Treatment:     45   mins  Nando Loredo, PT, DPT  Physical Therapist

## 2021-08-24 ENCOUNTER — TREATMENT (OUTPATIENT)
Dept: PHYSICAL THERAPY | Facility: CLINIC | Age: 81
End: 2021-08-24

## 2021-08-24 DIAGNOSIS — M76.72 PERONEAL TENDONITIS OF LEFT LOWER LEG: Primary | ICD-10-CM

## 2021-08-24 DIAGNOSIS — M79.672 LEFT FOOT PAIN: ICD-10-CM

## 2021-08-24 PROCEDURE — 97140 MANUAL THERAPY 1/> REGIONS: CPT | Performed by: PHYSICAL THERAPIST

## 2021-08-24 PROCEDURE — 97110 THERAPEUTIC EXERCISES: CPT | Performed by: PHYSICAL THERAPIST

## 2021-08-24 NOTE — PROGRESS NOTES
Physical Therapy Daily Progress Note  Visit: 14    Karla Pollard reports: she is improving with less AM pain and ability to walk/stand w/o increased pain.     Subjective       Objective   See Exercise, Manual, and Modality Logs for complete treatment.       Assessment/Plan     Progressing well - less discomfort with manual therapy and resisted activity to L LE.  Notes improving ability to do daily tasks now.     Plan:    Continue current         Timed:         Manual Therapy:    12     mins  88508;     Therapeutic Exercise:    31     mins  93986;         Timed Treatment:   43   mins   Total Treatment:     43   mins  Nando Loredo, PT, DPT  Physical Therapist

## 2021-08-31 ENCOUNTER — TREATMENT (OUTPATIENT)
Dept: PHYSICAL THERAPY | Facility: CLINIC | Age: 81
End: 2021-08-31

## 2021-08-31 DIAGNOSIS — M76.72 PERONEAL TENDONITIS OF LEFT LOWER LEG: Primary | ICD-10-CM

## 2021-08-31 DIAGNOSIS — M79.672 LEFT FOOT PAIN: ICD-10-CM

## 2021-08-31 PROCEDURE — 97140 MANUAL THERAPY 1/> REGIONS: CPT | Performed by: PHYSICAL THERAPIST

## 2021-08-31 PROCEDURE — 97110 THERAPEUTIC EXERCISES: CPT | Performed by: PHYSICAL THERAPIST

## 2021-08-31 NOTE — PROGRESS NOTES
Physical Therapy Daily Progress Note  Visit: 15    Karla Pollard reports: more sore the past few days - feels may have overworked between walking at home and increased PT exercise.     Subjective       Objective   See Exercise, Manual, and Modality Logs for complete treatment.       Assessment/Plan     Slight increase in L lateral foot pain today - good tolerance with modifications to resistance.    Plan:    Continue current         Timed:         Manual Therapy:    9     mins  56973;     Therapeutic Exercise:    33     mins  70584;         Timed Treatment:   42   mins   Total Treatment:     42   mins  Nando Loredo PT, DPT  Physical Therapist

## 2021-09-09 ENCOUNTER — TREATMENT (OUTPATIENT)
Dept: PHYSICAL THERAPY | Facility: CLINIC | Age: 81
End: 2021-09-09

## 2021-09-09 DIAGNOSIS — M79.672 LEFT FOOT PAIN: ICD-10-CM

## 2021-09-09 DIAGNOSIS — M76.72 PERONEAL TENDONITIS OF LEFT LOWER LEG: Primary | ICD-10-CM

## 2021-09-09 PROCEDURE — 97110 THERAPEUTIC EXERCISES: CPT | Performed by: PHYSICAL THERAPIST

## 2021-09-09 PROCEDURE — 97140 MANUAL THERAPY 1/> REGIONS: CPT | Performed by: PHYSICAL THERAPIST

## 2021-09-12 NOTE — PROGRESS NOTES
Physical Therapy Daily Progress Note  Visit: 16    Karla Pollard reports: she feels she is improving still - returns to MD next week.     Subjective       Objective     LT. LEFS score = 90% or greater within 6 weeks. - Progressed towards   2. Left ankle/foot strength = R within 6 weeks. - Progressed towards   3. Gait mechanics normal over 6 weeks with ability to resume prior walking program w/o pain.  - Progressed towards  4. FTSST time decreased to 13 seconds or less by discharge - Progressed towards   See Exercise, Manual, and Modality Logs for complete treatment.       Assessment/Plan     Demonstrating improving L foot ROM/strength/function.  Requires gradual progression of loading to L foot/LE per lateral foot and knee soreness with education on walking home program and HEP.            Timed:         Manual Therapy:    10     mins  12960;     Therapeutic Exercise:    15     mins  22764;         Timed Treatment:   25   mins   Total Treatment:     25   mins  Nando Loredo PT, DPT  Physical Therapist

## 2021-09-16 ENCOUNTER — OFFICE VISIT (OUTPATIENT)
Dept: PODIATRY | Facility: CLINIC | Age: 81
End: 2021-09-16

## 2021-09-16 ENCOUNTER — TREATMENT (OUTPATIENT)
Dept: PHYSICAL THERAPY | Facility: CLINIC | Age: 81
End: 2021-09-16

## 2021-09-16 VITALS
HEIGHT: 63 IN | WEIGHT: 128 LBS | HEART RATE: 87 BPM | DIASTOLIC BLOOD PRESSURE: 70 MMHG | BODY MASS INDEX: 22.68 KG/M2 | SYSTOLIC BLOOD PRESSURE: 163 MMHG

## 2021-09-16 DIAGNOSIS — G89.29 CHRONIC PAIN OF LEFT ANKLE: Primary | ICD-10-CM

## 2021-09-16 DIAGNOSIS — M25.572 CHRONIC PAIN OF LEFT ANKLE: Primary | ICD-10-CM

## 2021-09-16 DIAGNOSIS — M76.72 PERONEAL TENDINITIS OF LEFT LOWER EXTREMITY: ICD-10-CM

## 2021-09-16 DIAGNOSIS — M76.72 PERONEAL TENDONITIS OF LEFT LOWER LEG: ICD-10-CM

## 2021-09-16 DIAGNOSIS — M79.672 LEFT FOOT PAIN: Primary | ICD-10-CM

## 2021-09-16 PROCEDURE — 97110 THERAPEUTIC EXERCISES: CPT | Performed by: PHYSICAL THERAPIST

## 2021-09-16 PROCEDURE — 97140 MANUAL THERAPY 1/> REGIONS: CPT | Performed by: PHYSICAL THERAPIST

## 2021-09-16 PROCEDURE — 99213 OFFICE O/P EST LOW 20 MIN: CPT | Performed by: PODIATRIST

## 2021-09-16 RX ORDER — LEVOTHYROXINE SODIUM 0.07 MG/1
75 TABLET ORAL DAILY
COMMUNITY
Start: 2021-09-01

## 2021-09-16 NOTE — PROGRESS NOTES
"09/16/2021  Foot and Ankle Surgery - Established Patient/Follow-up  Provider: Dr. Tamir Melton DPM  Location: [facility]    Subjective:  Karla Pollard is a 81 y.o. female.     Chief Complaint   Patient presents with   • Left Ankle - Pain   • Follow-up     last pcp 9/2/2021       HPI: Patient returns for follow-up on her left foot and ankle pain.  She states that she is doing better.  She continues to have mild discomfort with first few steps in the morning and after periods of rest.  She is able to perform normal daily activities without any significant limitation.  She has decreased her activity but continues to perform daily exercises.    Allergies   Allergen Reactions   • Bisacodyl Rash   • Sulfa Antibiotics Hives   • Docusate Calcium Rash   • Sulfamethoxazole-Trimethoprim Rash       Current Outpatient Medications on File Prior to Visit   Medication Sig Dispense Refill   • Cholecalciferol (VITAMIN D3) 50 MCG (2000 UT) capsule TAKE ONE CAPSULE BY MOUTH DAILY     • Cholecalciferol 50 MCG (2000 UT) capsule Take 2,000 Units by mouth Daily.     • levothyroxine (SYNTHROID, LEVOTHROID) 75 MCG tablet      • levothyroxine (SYNTHROID, LEVOTHROID) 75 MCG tablet Take 75 mcg by mouth Daily.     • simvastatin (ZOCOR) 40 MG tablet SIMVASTATIN 40 MG TABS       No current facility-administered medications on file prior to visit.       Objective   /70   Pulse 87   Ht 160 cm (63\")   Wt 58.1 kg (128 lb)   BMI 22.67 kg/m²     General:   Appearance: appears stated age and healthy    Orientation: AAOx3    Affect: appropriate    Gait: antalgic       VASCULAR       Left Foot Vascularity   Normal vascular exam    Dorsalis pedis:  2+  Posterior tibial:  2+  Skin Temperature: warm    Edema Grading:  None  CFT:  < 3 seconds  Pedal Hair Growth:  Present  Varicosities: none        NEUROLOGIC      Left Foot Neurologic   Light touch sensation:  Normal  Hot/cold sensation: normal    Achilles " reflex:  2+      MUSCULOSKELETAL       Left Foot Musculoskeletal   Ecchymosis:  None  Tenderness: lateral malleolus and anterior tibiotalar joint line    Arch:  Normal      MUSCLE STRENGTH      Left Foot Muscle Strength   Normal strength    Foot dorsiflexion:  5  Foot plantar flexion:  5  Foot inversion:  5  Foot eversion:  5      RANGE OF MOTION       Left Foot Range of Motion   Foot and ankle ROM within normal limits        DERMATOLOGIC      Left Foot Dermatologic   Skin: skin intact        TESTS      Left Foot Tests   Anterior drawer: positive    Varus tilt: negative     Assessment/Plan   Diagnoses and all orders for this visit:    1. Chronic pain of left ankle (Primary)    2. Peroneal tendinitis of left lower extremity      Patient has discomfort involving the left foot at times.  She states that the symptoms are noticed after periods of rest and first few steps in the morning.  I explained that this is likely scar tissue.  We did discuss warm water soaks and range of motion exercises.  I have asked that she finish formal physical therapy.  She does appear to be doing quite well overall.  She has modified her exercise and has started to perform low impact activities.  I have asked that she continue to monitor closely and call with any additional issues or concerns.  I would like to see her in 3 months for routine check.  Greater than 20 minutes was spent before, during, and after evaluation for patient care    No orders of the defined types were placed in this encounter.         Note is dictated utilizing voice recognition software. Unfortunately this leads to occasional typographical errors. I apologize in advance if the situation occurs. If questions occur please do not hesitate to call our office.

## 2021-09-16 NOTE — PROGRESS NOTES
Physical Therapy Daily Progress Note  Visit: 17    Karla Pollard reports: returned to MD and advised of options including continuing PT or surgery.  Reports she would like to continue PT until the end of this month if still beneficial.     Subjective       Objective   See Exercise, Manual, and Modality Logs for complete treatment.       Assessment/Plan     Educated/review HEP with plan to transition over the next 2 weeks.  Has demonstrated improving ROM/strength in L ankle and LE with decreasing pain.     Plan:    Continue current           Timed:         Manual Therapy:    9     mins  24949;     Therapeutic Exercise:    35     mins  35966;         Timed Treatment:   44   mins   Total Treatment:     44   mins  Nando Loredo, PT, DPT  Physical Therapist

## 2021-09-21 ENCOUNTER — TREATMENT (OUTPATIENT)
Dept: PHYSICAL THERAPY | Facility: CLINIC | Age: 81
End: 2021-09-21

## 2021-09-21 DIAGNOSIS — M76.72 PERONEAL TENDONITIS OF LEFT LOWER LEG: ICD-10-CM

## 2021-09-21 DIAGNOSIS — M79.672 LEFT FOOT PAIN: Primary | ICD-10-CM

## 2021-09-21 PROCEDURE — 97112 NEUROMUSCULAR REEDUCATION: CPT | Performed by: PHYSICAL THERAPIST

## 2021-09-21 PROCEDURE — 97140 MANUAL THERAPY 1/> REGIONS: CPT | Performed by: PHYSICAL THERAPIST

## 2021-09-21 PROCEDURE — 97110 THERAPEUTIC EXERCISES: CPT | Performed by: PHYSICAL THERAPIST

## 2021-09-21 NOTE — PROGRESS NOTES
Physical Therapy Daily Progress Note  Visit: 18    Karla Pollard reports: she is feeling better but still has to be cautious with too much walking or being on uneven ground.     Subjective       Objective   See Exercise, Manual, and Modality Logs for complete treatment.       Assessment/Plan     Improving EV strength/ROM and activity tolerance.  Likely able to transition to HEP only after next several visits.     Plan:           Timed:         Manual Therapy:    9     mins  93075;     Therapeutic Exercise:    21     mins  68892;     Neuromuscular Jojo:    10    mins  95477;          Timed Treatment:   40   mins   Total Treatment:     40   mins  Nando Loredo, PT, DPT  Physical Therapist

## 2021-09-28 ENCOUNTER — TREATMENT (OUTPATIENT)
Dept: PHYSICAL THERAPY | Facility: CLINIC | Age: 81
End: 2021-09-28

## 2021-09-28 DIAGNOSIS — M76.72 PERONEAL TENDONITIS OF LEFT LOWER LEG: ICD-10-CM

## 2021-09-28 DIAGNOSIS — M79.672 LEFT FOOT PAIN: Primary | ICD-10-CM

## 2021-09-28 PROCEDURE — 97110 THERAPEUTIC EXERCISES: CPT | Performed by: PHYSICAL THERAPIST

## 2021-09-28 PROCEDURE — 97112 NEUROMUSCULAR REEDUCATION: CPT | Performed by: PHYSICAL THERAPIST

## 2021-09-28 NOTE — PROGRESS NOTES
Physical Therapy Daily Progress Note  Visit: 19    Karla Pollard reports: pleased with progress - still with some left foot pain but much less than prior.  Reports feels able to continue with HEP after this visit.     Subjective       Objective   See Exercise, Manual, and Modality Logs for complete treatment.       Assessment/Plan     Good understanding of HEP with minimal cueing needed this visit.   Reviewed HEP and increased decreased loading per pain with exercise and balance progressions to allow walking in her yard and on her driveway which she has not done per steep incline.     Plan:    Trial HEP only            Timed:         Manual Therapy:    3     mins  04948;     Therapeutic Exercise:    30     mins  33782;     Neuromuscular Jojo:    10    mins  [id number];        Timed Treatment:   43   mins   Total Treatment:     43   mins  Nando Loredo, PT, DPT  Physical Therapist

## 2021-12-16 ENCOUNTER — OFFICE VISIT (OUTPATIENT)
Dept: PODIATRY | Facility: CLINIC | Age: 81
End: 2021-12-16

## 2021-12-16 VITALS
WEIGHT: 128 LBS | SYSTOLIC BLOOD PRESSURE: 183 MMHG | DIASTOLIC BLOOD PRESSURE: 75 MMHG | HEART RATE: 91 BPM | HEIGHT: 63 IN | BODY MASS INDEX: 22.68 KG/M2

## 2021-12-16 DIAGNOSIS — G89.29 CHRONIC PAIN OF LEFT ANKLE: Primary | ICD-10-CM

## 2021-12-16 DIAGNOSIS — M76.72 PERONEAL TENDINITIS OF LEFT LOWER EXTREMITY: ICD-10-CM

## 2021-12-16 DIAGNOSIS — M25.572 CHRONIC PAIN OF LEFT ANKLE: Primary | ICD-10-CM

## 2021-12-16 PROCEDURE — 99213 OFFICE O/P EST LOW 20 MIN: CPT | Performed by: PODIATRIST

## 2021-12-16 NOTE — PROGRESS NOTES
"12/16/2021  Foot and Ankle Surgery - Established Patient/Follow-up  Provider: Dr. Tamir Melton DPM  Location: [facility]    Subjective:  Karla Pollard is a 81 y.o. female.     Chief Complaint   Patient presents with   • Left Ankle - Pain   • Follow-up     last pcp appt with Bubba cormier MD 9/1/2021       HPI: Patient returns with pain involving her left ankle.  She states that the symptoms started approximately 6 weeks ago when she tried to increase her activity.  She has noticed progressive discomfort causing limitation with daily activity.  Today, she states that the symptoms are mild.  Symptoms are worse after walking.  She has tried various OTC ankle braces and supports with only minimal improvement.  She is unaware of any recent injury.    Allergies   Allergen Reactions   • Bisacodyl Rash   • Sulfa Antibiotics Hives   • Docusate Calcium Rash   • Sulfamethoxazole-Trimethoprim Rash       Current Outpatient Medications on File Prior to Visit   Medication Sig Dispense Refill   • Cholecalciferol (VITAMIN D3) 50 MCG (2000 UT) capsule TAKE ONE CAPSULE BY MOUTH DAILY     • Cholecalciferol 50 MCG (2000 UT) capsule Take 2,000 Units by mouth Daily.     • levothyroxine (SYNTHROID, LEVOTHROID) 75 MCG tablet      • levothyroxine (SYNTHROID, LEVOTHROID) 75 MCG tablet Take 75 mcg by mouth Daily.     • simvastatin (ZOCOR) 40 MG tablet SIMVASTATIN 40 MG TABS       No current facility-administered medications on file prior to visit.       Objective   BP (!) 183/75   Pulse 91   Ht 160 cm (63\")   Wt 58.1 kg (128 lb)   BMI 22.67 kg/m²     General:   Appearance: appears stated age and healthy    Orientation: AAOx3    Affect: appropriate    Gait: antalgic       VASCULAR       Left Foot Vascularity   Normal vascular exam    Dorsalis pedis:  2+  Posterior tibial:  2+  Skin Temperature: warm    Edema Grading:  None  CFT:  < 3 seconds  Pedal Hair Growth:  Present  Varicosities: none        NEUROLOGIC      Left Foot " Neurologic   Light touch sensation:  Normal  Hot/cold sensation: normal    Achilles reflex:  2+      MUSCULOSKELETAL       Left Foot Musculoskeletal   Ecchymosis:  None  Tenderness: Mild discomfort along the peroneal tendon course.   Arch:  Normal      MUSCLE STRENGTH      Left Foot Muscle Strength   Normal strength    Foot dorsiflexion:  5  Foot plantar flexion:  5  Foot inversion:  5  Foot eversion:  5      RANGE OF MOTION       Left Foot Range of Motion   Foot and ankle ROM within normal limits        DERMATOLOGIC      Left Foot Dermatologic   Skin: skin intact        TESTS      Left Foot Tests   Anterior drawer: positive    Varus tilt: negative     No progressive deformity or instability.  Mild inflammation noted along the distal course of the peroneal tendons.    Assessment/Plan   Diagnoses and all orders for this visit:    1. Chronic pain of left ankle (Primary)    2. Peroneal tendinitis of left lower extremity      Patient returns with recurrent pain involving the lateral aspect of the left ankle.  Findings are very similar to previous symptoms.  Her findings appear to be consistent with peroneal tendinitis.  I anticipate that she likely caused disruption in the healing process with increased activity.  I did discuss this with her at length.  We did review further treatment options of continued observation in decreased activity versus consideration for operative intervention given that she has had a previous MRI that shows peroneal tendon tear.  Patient does not want to consider surgery.  I have recommended that she proceed with a lace up ankle brace.  We did dispense the brace and I did review proper use and effects.  We did review the possibility of formal physical therapy which she does not want to consider.  I have asked that she monitor and call with any additional issues or concerns.  I will see her in 4 weeks for reevaluation.  Greater than 20 minutes was spent before, during, and after evaluation for  patient care.    No orders of the defined types were placed in this encounter.         Note is dictated utilizing voice recognition software. Unfortunately this leads to occasional typographical errors. I apologize in advance if the situation occurs. If questions occur please do not hesitate to call our office.

## 2022-01-13 ENCOUNTER — OFFICE VISIT (OUTPATIENT)
Dept: PODIATRY | Facility: CLINIC | Age: 82
End: 2022-01-13

## 2022-01-13 VITALS
SYSTOLIC BLOOD PRESSURE: 154 MMHG | BODY MASS INDEX: 22.68 KG/M2 | HEART RATE: 84 BPM | WEIGHT: 128 LBS | HEIGHT: 63 IN | DIASTOLIC BLOOD PRESSURE: 66 MMHG

## 2022-01-13 DIAGNOSIS — M25.572 CHRONIC PAIN OF LEFT ANKLE: Primary | ICD-10-CM

## 2022-01-13 DIAGNOSIS — G89.29 CHRONIC PAIN OF LEFT ANKLE: Primary | ICD-10-CM

## 2022-01-13 DIAGNOSIS — M76.72 PERONEAL TENDINITIS OF LEFT LOWER EXTREMITY: ICD-10-CM

## 2022-01-13 PROCEDURE — 99212 OFFICE O/P EST SF 10 MIN: CPT | Performed by: PODIATRIST

## 2022-01-13 NOTE — PROGRESS NOTES
"01/13/2022  Foot and Ankle Surgery - Established Patient/Follow-up  Provider: Dr. Tamir Melton DPM  Location: [facility]    Subjective:  Karla Pollard is a 81 y.o. female.     Chief Complaint   Patient presents with   • Left Ankle - Pain   • Follow-up     last pcp appt with jennifer cormier md  9/1/2021       HPI: Patient is doing well.  She has noticed significant improvement since last exam.  She has decreased overall activity.  No new issues or concerns.    Allergies   Allergen Reactions   • Bisacodyl Rash   • Sulfa Antibiotics Hives   • Docusate Calcium Rash   • Sulfamethoxazole-Trimethoprim Rash       Current Outpatient Medications on File Prior to Visit   Medication Sig Dispense Refill   • Cholecalciferol (VITAMIN D3) 50 MCG (2000 UT) capsule TAKE ONE CAPSULE BY MOUTH DAILY     • Cholecalciferol 50 MCG (2000 UT) capsule Take 2,000 Units by mouth Daily.     • levothyroxine (SYNTHROID, LEVOTHROID) 75 MCG tablet      • levothyroxine (SYNTHROID, LEVOTHROID) 75 MCG tablet Take 75 mcg by mouth Daily.     • simvastatin (ZOCOR) 40 MG tablet SIMVASTATIN 40 MG TABS       No current facility-administered medications on file prior to visit.       Objective   /66   Pulse 84   Ht 160 cm (63\")   Wt 58.1 kg (128 lb)   BMI 22.67 kg/m²     General:   Appearance: appears stated age and healthy    Orientation: AAOx3    Affect: appropriate    Gait: antalgic       VASCULAR       Left Foot Vascularity   Normal vascular exam    Dorsalis pedis:  2+  Posterior tibial:  2+  Skin Temperature: warm    Edema Grading:  None  CFT:  < 3 seconds  Pedal Hair Growth:  Present  Varicosities: none        NEUROLOGIC      Left Foot Neurologic   Light touch sensation:  Normal  Hot/cold sensation: normal    Achilles reflex:  2+      MUSCULOSKELETAL       Left Foot Musculoskeletal   Ecchymosis:  None  Tenderness: Mild discomfort along the peroneal tendon course.   Arch:  Normal      MUSCLE STRENGTH      Left Foot Muscle " Strength   Normal strength    Foot dorsiflexion:  5  Foot plantar flexion:  5  Foot inversion:  5  Foot eversion:  5      RANGE OF MOTION       Left Foot Range of Motion   Foot and ankle ROM within normal limits        DERMATOLOGIC      Left Foot Dermatologic   Skin: skin intact        TESTS      Left Foot Tests   Anterior drawer: positive    Varus tilt: negative      No progressive deformity or instability.     Assessment/Plan   Diagnoses and all orders for this visit:    1. Chronic pain of left ankle (Primary)    2. Peroneal tendinitis of left lower extremity      Patient is doing well.  Her physical exam is relatively unchanged but she does have less discomfort and swelling involving her peroneal tendons.  She states that she has decreased her activity which has helped with her symptoms substantially.  She has been intermittently wearing the lace up ankle brace.  No other issues or concerns.  I have asked that she continued decreased activity and we did discuss appropriate support and shoes.  She is to call with any new issues and I will see her on an as-needed basis    No orders of the defined types were placed in this encounter.         Note is dictated utilizing voice recognition software. Unfortunately this leads to occasional typographical errors. I apologize in advance if the situation occurs. If questions occur please do not hesitate to call our office.